# Patient Record
Sex: FEMALE | Race: WHITE
[De-identification: names, ages, dates, MRNs, and addresses within clinical notes are randomized per-mention and may not be internally consistent; named-entity substitution may affect disease eponyms.]

---

## 2017-03-03 ENCOUNTER — RECORDS - HEALTHEAST (OUTPATIENT)
Dept: ADMINISTRATIVE | Facility: OTHER | Age: 47
End: 2017-03-03

## 2017-05-18 ENCOUNTER — HOSPITAL ENCOUNTER (OUTPATIENT)
Dept: MAMMOGRAPHY | Facility: HOSPITAL | Age: 47
Discharge: HOME | End: 2017-05-18
Attending: OBSTETRICS & GYNECOLOGY

## 2017-05-18 DIAGNOSIS — Z12.31 VISIT FOR SCREENING MAMMOGRAM: ICD-10-CM

## 2017-10-24 ENCOUNTER — COMMUNICATION - HEALTHEAST (OUTPATIENT)
Dept: FAMILY MEDICINE | Facility: CLINIC | Age: 47
End: 2017-10-24

## 2017-10-26 ENCOUNTER — OFFICE VISIT - HEALTHEAST (OUTPATIENT)
Dept: FAMILY MEDICINE | Facility: CLINIC | Age: 47
End: 2017-10-26

## 2017-10-26 DIAGNOSIS — K76.89 LIVER CYST: ICD-10-CM

## 2017-10-26 DIAGNOSIS — Z13.0 SCREENING, ANEMIA, DEFICIENCY, IRON: ICD-10-CM

## 2017-10-26 DIAGNOSIS — Z13.29 SCREENING FOR ENDOCRINE DISORDER: ICD-10-CM

## 2017-10-26 DIAGNOSIS — K21.9 GASTROESOPHAGEAL REFLUX DISEASE WITHOUT ESOPHAGITIS: ICD-10-CM

## 2017-10-26 DIAGNOSIS — Z13.228 ENCOUNTER FOR SCREENING FOR METABOLIC DISORDER: ICD-10-CM

## 2017-10-26 DIAGNOSIS — R10.32 ABDOMINAL PAIN, LEFT LOWER QUADRANT: ICD-10-CM

## 2017-10-26 DIAGNOSIS — Z13.220 SCREENING, LIPID: ICD-10-CM

## 2017-10-26 DIAGNOSIS — R19.5 NONSPECIFIC ABNORMAL FINDING IN STOOL CONTENTS: ICD-10-CM

## 2017-10-26 DIAGNOSIS — L85.3 DRY SKIN: ICD-10-CM

## 2017-10-26 DIAGNOSIS — J30.9 ALLERGIC RHINITIS: ICD-10-CM

## 2017-10-26 DIAGNOSIS — R63.5 WEIGHT GAIN: ICD-10-CM

## 2017-10-26 RX ORDER — FLUOXETINE 10 MG/1
CAPSULE ORAL
Status: SHIPPED | COMMUNITY
Start: 2017-08-21

## 2017-10-26 RX ORDER — TRIAMCINOLONE ACETONIDE 0.25 MG/G
OINTMENT TOPICAL
Status: SHIPPED | COMMUNITY
Start: 2017-09-13

## 2017-10-26 NOTE — ASSESSMENT & PLAN NOTE
She had a visit with [facility] recently in 2016 after CT scan.  Obtaining those records.  Looking at the CT scan is recommended therapy for follow-up.  It is unclear to me if that follow-up has already been completed by seeing gastroenterology or if she needs another repeat CT scan.  Of note the liver lesions were stable from 2403-3340.

## 2017-10-30 ENCOUNTER — AMBULATORY - HEALTHEAST (OUTPATIENT)
Dept: LAB | Facility: CLINIC | Age: 47
End: 2017-10-30

## 2017-10-30 DIAGNOSIS — Z13.228 ENCOUNTER FOR SCREENING FOR METABOLIC DISORDER: ICD-10-CM

## 2017-10-30 DIAGNOSIS — Z13.0 SCREENING, ANEMIA, DEFICIENCY, IRON: ICD-10-CM

## 2017-10-30 DIAGNOSIS — Z13.220 SCREENING, LIPID: ICD-10-CM

## 2017-10-30 DIAGNOSIS — R63.5 WEIGHT GAIN: ICD-10-CM

## 2017-10-30 DIAGNOSIS — L85.3 DRY SKIN: ICD-10-CM

## 2017-10-30 DIAGNOSIS — Z13.29 SCREENING FOR ENDOCRINE DISORDER: ICD-10-CM

## 2017-10-30 LAB
CHOLEST SERPL-MCNC: 144 MG/DL
FASTING STATUS PATIENT QL REPORTED: YES
HDLC SERPL-MCNC: 51 MG/DL
LDLC SERPL CALC-MCNC: 84 MG/DL
TRIGL SERPL-MCNC: 44 MG/DL

## 2018-03-06 ENCOUNTER — RECORDS - HEALTHEAST (OUTPATIENT)
Dept: ADMINISTRATIVE | Facility: OTHER | Age: 48
End: 2018-03-06

## 2018-05-07 ENCOUNTER — RECORDS - HEALTHEAST (OUTPATIENT)
Dept: ADMINISTRATIVE | Facility: OTHER | Age: 48
End: 2018-05-07

## 2018-07-02 ENCOUNTER — RECORDS - HEALTHEAST (OUTPATIENT)
Dept: ADMINISTRATIVE | Facility: OTHER | Age: 48
End: 2018-07-02

## 2019-04-05 ENCOUNTER — RECORDS - HEALTHEAST (OUTPATIENT)
Dept: ADMINISTRATIVE | Facility: OTHER | Age: 49
End: 2019-04-05

## 2021-05-25 ENCOUNTER — RECORDS - HEALTHEAST (OUTPATIENT)
Dept: ADMINISTRATIVE | Facility: CLINIC | Age: 51
End: 2021-05-25

## 2021-05-31 VITALS — WEIGHT: 148 LBS | BODY MASS INDEX: 22.84 KG/M2

## 2021-06-13 NOTE — PROGRESS NOTES
ASSESSMENT AND PLAN:  Problem List Items Addressed This Visit     Allergic Rhinitis     Takes flonase in the fall.          Mucus In The Stool     Improved since discontinuing gluten.          GERD (gastroesophageal reflux disease)     Improved with decreasing dairy and white wine. Diet controlled.          RESOLVED: Abdominal pain, left lower quadrant     Resolved per Cynthia today.           Other Visit Diagnoses     Dry skin    -  Primary    Relevant Orders    Thyroid C[facility]    Weight gain        Relevant Orders    Thyroid [place]    Encounter for screening for metabolic disorder        Relevant Orders    Comprehensive Metabolic Panel    Screening, anemia, deficiency, iron        Relevant Orders    HM1(CBC and Differential)    Screening, lipid        Relevant Orders    Lipid [facility]    Screening for endocrine disorder        Relevant Orders    Vitamin D, Total (25-Hydroxy)           Chief Complaint   Patient presents with     Discuss blood work      HPI  Cynthia OMKAR Chong is a 47 y.o. female comes in today for medication and annual check.  She does mention some recent weight gain but feels that she is not getting larger as her clothes still fit her.  She wonders if she can have her thyroid checked also because of dry skin and get some of her fasting labs done.    History   Smoking Status     Never Smoker   Smokeless Tobacco     Never Used      Current Outpatient Prescriptions   Medication Sig Dispense Refill     cetirizine (ZYRTEC) 10 MG tablet Take 10 mg by mouth daily.       FLUoxetine (PROZAC) 10 MG capsule        LACTOBAC CMB #3/FOS/PANTETHINE (PROBIOTIC & ACIDOPHILUS ORAL) Take by mouth.       multivitamin capsule Take 1 capsule by mouth daily.       omeprazole (PRILOSEC) 20 MG capsule Take 20 mg by mouth 2 times a day at 6:00 am and 4:00 pm.       triamcinolone (KENALOG) 0.025 % ointment        No current facility-administered medications for this visit.      No Known Allergies  Review of Systems    Constitution: Negative.   HENT: Negative.    Eyes: Negative.    Cardiovascular: Negative.    Respiratory: Negative.    Hematologic/Lymphatic: Negative.    Skin: Negative.    Musculoskeletal: Negative.    Gastrointestinal: Negative.    Genitourinary: Negative.    Neurological: Negative.    Psychiatric/Behavioral: Negative.         OBJECTIVE: BP 90/68 (Patient Site: Left Arm, Patient Position: Sitting, Cuff Size: Adult Regular)  Pulse 72  Wt 148 lb (67.1 kg)  Breastfeeding? No  BMI 22.84 kg/m2  Physical Exam   Constitutional: She is oriented to person, place, and time. She appears well-developed and well-nourished. No distress.   HENT:   Head: Normocephalic and atraumatic.   Eyes: Conjunctivae are normal.   Neck: Neck supple.   Cardiovascular: Normal rate and regular rhythm.    Pulmonary/Chest: Effort normal.   Musculoskeletal: Normal range of motion.   Neurological: She is alert and oriented to person, place, and time.   Skin: Skin is warm and dry.   Psychiatric: She has a normal mood and affect.      inbody done today. Her body composition is normal weight strong type. Very healthy despite recent weight gain.

## 2021-07-13 ENCOUNTER — RECORDS - HEALTHEAST (OUTPATIENT)
Dept: ADMINISTRATIVE | Facility: CLINIC | Age: 51
End: 2021-07-13

## 2021-07-21 ENCOUNTER — RECORDS - HEALTHEAST (OUTPATIENT)
Dept: ADMINISTRATIVE | Facility: CLINIC | Age: 51
End: 2021-07-21

## 2021-08-15 ENCOUNTER — HEALTH MAINTENANCE LETTER (OUTPATIENT)
Age: 51
End: 2021-08-15

## 2021-10-11 ENCOUNTER — HEALTH MAINTENANCE LETTER (OUTPATIENT)
Age: 51
End: 2021-10-11

## 2022-09-25 ENCOUNTER — HEALTH MAINTENANCE LETTER (OUTPATIENT)
Age: 52
End: 2022-09-25

## 2023-10-14 ENCOUNTER — HEALTH MAINTENANCE LETTER (OUTPATIENT)
Age: 53
End: 2023-10-14